# Patient Record
Sex: MALE | Race: ASIAN | NOT HISPANIC OR LATINO | Employment: OTHER | URBAN - METROPOLITAN AREA
[De-identification: names, ages, dates, MRNs, and addresses within clinical notes are randomized per-mention and may not be internally consistent; named-entity substitution may affect disease eponyms.]

---

## 2018-04-10 ENCOUNTER — HOSPITAL ENCOUNTER (OUTPATIENT)
Dept: MRI IMAGING | Facility: HOSPITAL | Age: 48
Discharge: HOME | End: 2018-04-10
Attending: FAMILY MEDICINE | Admitting: FAMILY MEDICINE

## 2018-04-16 ENCOUNTER — HOSPITAL ENCOUNTER (OUTPATIENT)
Dept: NUCLEAR MEDICINE | Facility: HOSPITAL | Age: 48
Discharge: HOME | End: 2018-04-16
Attending: INTERNAL MEDICINE | Admitting: INTERNAL MEDICINE

## 2018-06-11 ENCOUNTER — HOSPITAL ENCOUNTER (OUTPATIENT)
Dept: PAIN MEDICINE | Facility: HOSPITAL | Age: 48
Discharge: HOME | End: 2018-06-11
Attending: PHYSICAL MEDICINE & REHABILITATION | Admitting: PHYSICAL MEDICINE & REHABILITATION

## 2018-07-10 ENCOUNTER — HOSPITAL ENCOUNTER (OUTPATIENT)
Dept: PAIN MEDICINE | Facility: HOSPITAL | Age: 48
Discharge: HOME | End: 2018-07-10
Attending: PHYSICAL MEDICINE & REHABILITATION | Admitting: PHYSICAL MEDICINE & REHABILITATION

## 2019-07-10 ENCOUNTER — OFFICE VISIT (OUTPATIENT)
Dept: ORTHOPEDIC SURGERY | Facility: CLINIC | Age: 49
End: 2019-07-10

## 2019-07-10 VITALS
SYSTOLIC BLOOD PRESSURE: 119 MMHG | DIASTOLIC BLOOD PRESSURE: 70 MMHG | HEART RATE: 76 BPM | WEIGHT: 169.2 LBS | BODY MASS INDEX: 28.19 KG/M2 | HEIGHT: 65 IN

## 2019-07-10 DIAGNOSIS — M51.37 DDD (DEGENERATIVE DISC DISEASE), LUMBOSACRAL: Primary | ICD-10-CM

## 2019-07-10 PROCEDURE — 99213 OFFICE O/P EST LOW 20 MIN: CPT | Performed by: ORTHOPAEDIC SURGERY

## 2019-07-10 RX ORDER — FLUTICASONE PROPIONATE 50 MCG
SPRAY, SUSPENSION (ML) NASAL
Refills: 0 | COMMUNITY
Start: 2019-07-05

## 2019-07-10 RX ORDER — VENLAFAXINE HYDROCHLORIDE 75 MG/1
75 CAPSULE, EXTENDED RELEASE ORAL DAILY
Refills: 0 | COMMUNITY
Start: 2019-06-05 | End: 2020-08-25

## 2019-07-10 RX ORDER — NAPROXEN SODIUM 220 MG
TABLET ORAL
COMMUNITY
Start: 2013-08-16

## 2019-07-10 RX ORDER — CYCLOBENZAPRINE HCL 5 MG
5 TABLET ORAL NIGHTLY PRN
Refills: 0 | COMMUNITY
Start: 2019-05-30

## 2019-07-10 NOTE — PROGRESS NOTES
Visit Type: Follow Up  Referring Provider: No ref. provider found  Primary Care Provider: Cheyenne Argueta MD    Patient Name: Joel Hickman  Patient Age: 49 y.o.  Chief Complaint: had concerns including Follow-up and Pain of the Lumbar Spine.    Subjective   History of Present Illness: This patient is a 49 years old gentleman who complains of axial lower back pain with radiation up into his lower extremities right worse than left patient was diagnosed with multilevel disc degeneration spinal foraminal stenosis, congenital stenosis and short pedicle syndrome.      Review of Systems   Musculoskeletal: Positive for back pain and myalgias.   Neurological: Positive for numbness.       The following portions of the patient's history were reviewed and updated as appropriate: allergies, current medications, past family history, past medical history, past social history, past surgical history and problem list.    Past Medical History:   Diagnosis Date   • Arthritis    • Depression    • Diabetes mellitus (CMS/HCC)    • Hyperlipidemia    • Low back pain    • Pain management     Dr Sanders, 2 years       Past Surgical History:   Procedure Laterality Date   • APPENDECTOMY     • HERNIA REPAIR         Vitals:    07/10/19 1447   BP: 119/70   Pulse: 76       There is no problem list on file for this patient.      Family History Summary:  family history includes Other in his other.    Social History     Socioeconomic History   • Marital status:      Spouse name: Not on file   • Number of children: Not on file   • Years of education: Not on file   • Highest education level: Not on file   Tobacco Use   • Smoking status: Former Smoker     Packs/day: 0.50     Types: Cigarettes   Substance and Sexual Activity   • Alcohol use: Yes     Frequency: 2-4 times a month     Drinks per session: Patient refused   • Drug use: No   • Sexual activity: Defer         Current Outpatient Medications:   •  cyclobenzaprine (FLEXERIL) 5 MG tablet,  Take 5 mg by mouth At Night As Needed., Disp: , Rfl: 0  •  diclofenac (VOLTAREN) 1 % gel gel, apply 2 grams to AFFECTED JOINTS twice a day if needed for pain, Disp: , Rfl: 0  •  esomeprazole (nexIUM) 20 MG capsule, Take 20 mg by mouth Daily., Disp: , Rfl: 0  •  fluticasone (FLONASE) 50 MCG/ACT nasal spray, , Disp: , Rfl: 0  •  naproxen sodium (ALEVE) 220 MG tablet, Take  by mouth., Disp: , Rfl:   •  venlafaxine XR (EFFEXOR-XR) 75 MG 24 hr capsule, Take 75 mg by mouth Daily., Disp: , Rfl: 0    Allergies   Allergen Reactions   • Morphine Arrhythmia           Objective   Physical Exam  Back Exam     Tenderness   The patient is experiencing tenderness in the lumbar.    Range of Motion   Extension: 50   Flexion: 30   Lateral bend right:  30 abnormal   Lateral bend left:  30 abnormal   Rotation right:  20 abnormal   Rotation left:  20 abnormal     Muscle Strength   The patient has normal back strength.    Tests   Straight leg raise right: positive  Straight leg raise left: negative    Reflexes   Patellar: normal  Achilles: normal  Biceps: normal  Babinski's sign: normal     Other   Toe walk: normal  Heel walk: normal  Sensation: normal  Gait: normal               Impression and Plan: Patient is here today for follow-up patient was diagnosed with congenital spinal stenosis and short pedicle syndrome profoundly from L2-L5 patient tried a lumbar epidural block and physical therapy without significant benefit I will send this patient to proceed with facet block and radiofrequency and his pain does not get better I would like to obtain a CT scan of lumbar spine to be able to measure his bone density and possible surgical intervention in the future.    DDD (degenerative disc disease), lumbosacral [M51.37]     Orders Placed This Encounter   Procedures   • Ambulatory Referral to Pain Management     Referral Priority:   Routine     Referral Type:   Pain Management     Referral Reason:   Specialty Services Required     Requested  Specialty:   Pain Medicine     Number of Visits Requested:   1               Yuliya Graham MD  07/10/19  4:05 PM

## 2019-08-22 ENCOUNTER — APPOINTMENT (OUTPATIENT)
Dept: PAIN MEDICINE | Facility: CLINIC | Age: 49
End: 2019-08-22

## 2019-10-15 ENCOUNTER — APPOINTMENT (OUTPATIENT)
Dept: PAIN MEDICINE | Facility: CLINIC | Age: 49
End: 2019-10-15

## 2019-11-19 ENCOUNTER — TELEPHONE (OUTPATIENT)
Dept: PAIN MEDICINE | Facility: HOSPITAL | Age: 49
End: 2019-11-19

## 2019-11-19 ENCOUNTER — OFFICE VISIT (OUTPATIENT)
Dept: PAIN MEDICINE | Facility: CLINIC | Age: 49
End: 2019-11-19

## 2019-11-19 VITALS
WEIGHT: 175 LBS | RESPIRATION RATE: 16 BRPM | TEMPERATURE: 98.1 F | SYSTOLIC BLOOD PRESSURE: 123 MMHG | HEART RATE: 68 BPM | OXYGEN SATURATION: 99 % | HEIGHT: 65 IN | BODY MASS INDEX: 29.16 KG/M2 | DIASTOLIC BLOOD PRESSURE: 78 MMHG

## 2019-11-19 DIAGNOSIS — G89.29 CHRONIC BILATERAL LOW BACK PAIN WITH SCIATICA, SCIATICA LATERALITY UNSPECIFIED: Primary | ICD-10-CM

## 2019-11-19 DIAGNOSIS — M47.817 OSTEOARTHRITIS OF LUMBOSACRAL SPINE WITHOUT MYELOPATHY: ICD-10-CM

## 2019-11-19 DIAGNOSIS — M47.27 OSTEOARTHRITIS OF SPINE WITH RADICULOPATHY, LUMBOSACRAL REGION: ICD-10-CM

## 2019-11-19 DIAGNOSIS — M54.40 CHRONIC BILATERAL LOW BACK PAIN WITH SCIATICA, SCIATICA LATERALITY UNSPECIFIED: Primary | ICD-10-CM

## 2019-11-19 DIAGNOSIS — M47.816 ARTHROPATHY OF LUMBAR FACET JOINT: ICD-10-CM

## 2019-11-19 PROBLEM — M48.061 SPINAL STENOSIS OF LUMBAR REGION: Status: ACTIVE | Noted: 2018-05-23

## 2019-11-19 PROBLEM — M54.17 LUMBOSACRAL RADICULOPATHY: Status: ACTIVE | Noted: 2018-05-23

## 2019-11-19 PROBLEM — M79.7 FIBROSITIS: Status: ACTIVE | Noted: 2019-11-19

## 2019-11-19 PROBLEM — M51.36 BULGE OF LUMBAR DISC WITHOUT MYELOPATHY: Status: ACTIVE | Noted: 2019-11-19

## 2019-11-19 PROBLEM — M54.50 LOW BACK PAIN: Status: ACTIVE | Noted: 2018-05-23

## 2019-11-19 PROBLEM — M54.16 LUMBAR RADICULOPATHY: Status: ACTIVE | Noted: 2019-11-19

## 2019-11-19 PROCEDURE — 99213 OFFICE O/P EST LOW 20 MIN: CPT | Performed by: PHYSICAL MEDICINE & REHABILITATION

## 2019-11-19 PROCEDURE — G0463 HOSPITAL OUTPT CLINIC VISIT: HCPCS | Performed by: PHYSICAL MEDICINE & REHABILITATION

## 2019-11-19 RX ORDER — SERTRALINE HYDROCHLORIDE 25 MG/1
25 TABLET, FILM COATED ORAL DAILY
Refills: 0 | COMMUNITY
Start: 2019-11-18 | End: 2020-08-25

## 2019-11-19 RX ORDER — BUSPIRONE HYDROCHLORIDE 10 MG/1
10 TABLET ORAL 2 TIMES DAILY
Refills: 0 | COMMUNITY
Start: 2019-11-18

## 2019-11-19 NOTE — PROGRESS NOTES
Subjective   Joel Hickman is a 49 y.o. male.     Low back pain, began around 1998, worsening, 6/10 at best, 6/10 today, always present, varies, aching, sharp, worse with exercise, standing, interferes with ADLs, activity, sleep, failed meds, chiropractor. MRI L-spine with multilevel DDD, DJD. Saw Dr. Graham, notes reviewed, as above, with h/o failed LESIs, referral for L2-L5 blocks and RFA, congenital stenosis, with surgery as a last resort.          The following portions of the patient's history were reviewed and updated as appropriate: allergies, current medications, past family history, past medical history, past social history, past surgical history and problem list.    Review of Systems   Constitutional: Negative for chills, fatigue and fever.   HENT: Negative for hearing loss and trouble swallowing.    Eyes: Negative for visual disturbance.   Respiratory: Negative for shortness of breath.    Cardiovascular: Negative for chest pain.   Gastrointestinal: Negative for abdominal pain, constipation, diarrhea, nausea and vomiting.   Genitourinary: Negative for urinary incontinence.   Musculoskeletal: Positive for back pain and myalgias. Negative for arthralgias, joint swelling and neck pain.   Neurological: Negative for dizziness, weakness, numbness and headache.       Objective   Physical Exam   Constitutional: He is oriented to person, place, and time. He appears well-developed and well-nourished.   HENT:   Head: Normocephalic and atraumatic.   Eyes: EOM are normal. Pupils are equal, round, and reactive to light.   Neck: Normal range of motion.   Cardiovascular: Normal rate, regular rhythm, normal heart sounds and intact distal pulses.   Pulmonary/Chest: Breath sounds normal.   Abdominal: Soft. Bowel sounds are normal. He exhibits no distension. There is no tenderness.   Neurological: He is alert and oriented to person, place, and time. He has normal strength and normal reflexes. He displays normal reflexes. No  sensory deficit.   Psychiatric: He has a normal mood and affect. His behavior is normal. Thought content normal.         Assessment/Plan   There are no diagnoses linked to this encounter.    Performed b/l L2/3, L3/4, L4/5 MBB with nearly 100% relief.  Schedule left then right lumbar RFA.  No change in meds today.  RTC for procedures.

## 2020-01-13 ENCOUNTER — PRIOR AUTHORIZATION (OUTPATIENT)
Dept: PAIN MEDICINE | Facility: CLINIC | Age: 50
End: 2020-01-13

## 2020-01-13 NOTE — TELEPHONE ENCOUNTER
Dr. Castle patient's insurance has changed and with this new insurance you can not do bilateral and 3 levels. Would you like to do right or left side or change it to bilateral @ 2 levels.

## 2020-01-13 NOTE — TELEPHONE ENCOUNTER
We've already done one b/l 3 level. Let's do 3 level left and right separately. It'll take an extra injection, but then we'll be set up to give him more relief ongoing with the RFAs. Thanks.

## 2020-01-13 NOTE — TELEPHONE ENCOUNTER
Patient will need 2 appt's to do MBB @ 3 levels. One for right side and then another for left side. And I need to know what side he is doing on 1/27/2020 please? Thanks

## 2020-01-27 ENCOUNTER — APPOINTMENT (OUTPATIENT)
Dept: PAIN MEDICINE | Facility: HOSPITAL | Age: 50
End: 2020-01-27

## 2020-04-09 ENCOUNTER — APPOINTMENT (OUTPATIENT)
Dept: PAIN MEDICINE | Facility: CLINIC | Age: 50
End: 2020-04-09

## 2020-08-25 ENCOUNTER — OFFICE VISIT (OUTPATIENT)
Dept: PAIN MEDICINE | Facility: CLINIC | Age: 50
End: 2020-08-25

## 2020-08-25 VITALS
HEART RATE: 77 BPM | TEMPERATURE: 98.9 F | OXYGEN SATURATION: 99 % | BODY MASS INDEX: 28.32 KG/M2 | HEIGHT: 65 IN | SYSTOLIC BLOOD PRESSURE: 128 MMHG | DIASTOLIC BLOOD PRESSURE: 84 MMHG | WEIGHT: 170 LBS | RESPIRATION RATE: 16 BRPM

## 2020-08-25 DIAGNOSIS — M47.27 OSTEOARTHRITIS OF SPINE WITH RADICULOPATHY, LUMBOSACRAL REGION: ICD-10-CM

## 2020-08-25 DIAGNOSIS — M54.17 LUMBOSACRAL RADICULOPATHY: ICD-10-CM

## 2020-08-25 DIAGNOSIS — M47.816 ARTHROPATHY OF LUMBAR FACET JOINT: ICD-10-CM

## 2020-08-25 DIAGNOSIS — M54.40 CHRONIC BILATERAL LOW BACK PAIN WITH SCIATICA, SCIATICA LATERALITY UNSPECIFIED: Primary | ICD-10-CM

## 2020-08-25 DIAGNOSIS — M47.817 OSTEOARTHRITIS OF LUMBOSACRAL SPINE WITHOUT MYELOPATHY: ICD-10-CM

## 2020-08-25 DIAGNOSIS — M51.36 DDD (DEGENERATIVE DISC DISEASE), LUMBAR: ICD-10-CM

## 2020-08-25 DIAGNOSIS — G89.29 CHRONIC BILATERAL LOW BACK PAIN WITH SCIATICA, SCIATICA LATERALITY UNSPECIFIED: Primary | ICD-10-CM

## 2020-08-25 PROCEDURE — G0463 HOSPITAL OUTPT CLINIC VISIT: HCPCS | Performed by: PHYSICAL MEDICINE & REHABILITATION

## 2020-08-25 PROCEDURE — 99214 OFFICE O/P EST MOD 30 MIN: CPT | Performed by: PHYSICAL MEDICINE & REHABILITATION

## 2020-08-25 RX ORDER — NORTRIPTYLINE HYDROCHLORIDE 25 MG/1
25 CAPSULE ORAL NIGHTLY
Qty: 30 CAPSULE | Refills: 1 | Status: SHIPPED | OUTPATIENT
Start: 2020-08-25

## 2020-08-25 RX ORDER — FLUOXETINE HYDROCHLORIDE 20 MG/1
20 CAPSULE ORAL DAILY
COMMUNITY

## 2020-08-25 NOTE — PROGRESS NOTES
Subjective   Joel Hickman is a 50 y.o. male.     Low back pain, began around 1998, worsening, 6/10 at best, 6/10 today, always present, varies, aching, sharp, worse with exercise, standing, interferes with ADLs, activity, sleep, failed meds, chiropractor. MRI L-spine with multilevel DDD, DJD. Saw Dr. Graham, notes reviewed, as above, with h/o failed LESIs, referral for L2-L5 blocks and RFA, congenital stenosis, with surgery as a last resort. Had MBB x 2, does not wish to proceed with RFA at this time. Failed Norco and Percocet in past, as well as Cymbalta. Denies Effexor or Zoloft.       The following portions of the patient's history were reviewed and updated as appropriate: allergies, current medications, past family history, past medical history, past social history, past surgical history and problem list.    Review of Systems   Constitutional: Negative for chills, fatigue and fever.   HENT: Negative for hearing loss and trouble swallowing.    Eyes: Negative for visual disturbance.   Respiratory: Negative for shortness of breath.    Cardiovascular: Negative for chest pain.   Gastrointestinal: Negative for abdominal pain, constipation, diarrhea, nausea and vomiting.   Genitourinary: Negative for urinary incontinence.   Musculoskeletal: Positive for back pain and myalgias. Negative for arthralgias, joint swelling and neck pain.   Neurological: Negative for dizziness, weakness, numbness and headache.       Objective   Physical Exam   Constitutional: He is oriented to person, place, and time. He appears well-developed and well-nourished.   HENT:   Head: Normocephalic and atraumatic.   Eyes: Pupils are equal, round, and reactive to light. EOM are normal.   Neck: Normal range of motion.   Cardiovascular: Normal rate, regular rhythm, normal heart sounds and intact distal pulses.   Pulmonary/Chest: Breath sounds normal.   Abdominal: Soft. Bowel sounds are normal. He exhibits no distension. There is no tenderness.    Neurological: He is alert and oriented to person, place, and time. He has normal strength and normal reflexes. He displays normal reflexes. No sensory deficit.   Psychiatric: He has a normal mood and affect. His behavior is normal. Thought content normal.         Assessment/Plan   Joel was seen today for back pain.    Diagnoses and all orders for this visit:    Chronic bilateral low back pain with sciatica, sciatica laterality unspecified    Lumbosacral radiculopathy    Arthropathy of lumbar facet joint    DDD (degenerative disc disease), lumbar    Osteoarthritis of lumbosacral spine without myelopathy    Osteoarthritis of spine with radiculopathy, lumbosacral region        Performed b/l L2/3, L3/4, L4/5 MBB with nearly 100% relief.  Schedule left then right lumbar RFA.  No change in meds today.  RTC for procedures.

## 2020-10-20 ENCOUNTER — OFFICE VISIT (OUTPATIENT)
Dept: PAIN MEDICINE | Facility: CLINIC | Age: 50
End: 2020-10-20

## 2020-10-20 VITALS
OXYGEN SATURATION: 98 % | DIASTOLIC BLOOD PRESSURE: 80 MMHG | BODY MASS INDEX: 28.32 KG/M2 | RESPIRATION RATE: 16 BRPM | HEIGHT: 65 IN | SYSTOLIC BLOOD PRESSURE: 133 MMHG | WEIGHT: 170 LBS | TEMPERATURE: 97.8 F | HEART RATE: 72 BPM

## 2020-10-20 DIAGNOSIS — M48.061 SPINAL STENOSIS OF LUMBAR REGION, UNSPECIFIED WHETHER NEUROGENIC CLAUDICATION PRESENT: ICD-10-CM

## 2020-10-20 DIAGNOSIS — M51.36 DDD (DEGENERATIVE DISC DISEASE), LUMBAR: ICD-10-CM

## 2020-10-20 DIAGNOSIS — M54.40 CHRONIC BILATERAL LOW BACK PAIN WITH SCIATICA, SCIATICA LATERALITY UNSPECIFIED: ICD-10-CM

## 2020-10-20 DIAGNOSIS — M47.27 OSTEOARTHRITIS OF SPINE WITH RADICULOPATHY, LUMBOSACRAL REGION: ICD-10-CM

## 2020-10-20 DIAGNOSIS — F19.90 CURRENT DRUG USE: Primary | ICD-10-CM

## 2020-10-20 DIAGNOSIS — M47.816 ARTHROPATHY OF LUMBAR FACET JOINT: ICD-10-CM

## 2020-10-20 DIAGNOSIS — M47.817 OSTEOARTHRITIS OF LUMBOSACRAL SPINE WITHOUT MYELOPATHY: ICD-10-CM

## 2020-10-20 DIAGNOSIS — M54.17 LUMBOSACRAL RADICULOPATHY: ICD-10-CM

## 2020-10-20 DIAGNOSIS — G89.29 CHRONIC BILATERAL LOW BACK PAIN WITH SCIATICA, SCIATICA LATERALITY UNSPECIFIED: ICD-10-CM

## 2020-10-20 PROCEDURE — G0463 HOSPITAL OUTPT CLINIC VISIT: HCPCS | Performed by: PHYSICAL MEDICINE & REHABILITATION

## 2020-10-20 PROCEDURE — 99214 OFFICE O/P EST MOD 30 MIN: CPT | Performed by: PHYSICAL MEDICINE & REHABILITATION

## 2020-10-20 RX ORDER — HYDROCODONE BITARTRATE AND ACETAMINOPHEN 5; 325 MG/1; MG/1
1 TABLET ORAL EVERY 8 HOURS PRN
Qty: 90 TABLET | Refills: 0 | Status: SHIPPED | OUTPATIENT
Start: 2020-10-20 | End: 2020-10-20 | Stop reason: SDUPTHER

## 2020-10-20 RX ORDER — HYDROCODONE BITARTRATE AND ACETAMINOPHEN 5; 325 MG/1; MG/1
1 TABLET ORAL EVERY 8 HOURS PRN
Qty: 90 TABLET | Refills: 0 | Status: SHIPPED | OUTPATIENT
Start: 2020-10-20

## 2020-10-20 RX ORDER — HYDROCODONE BITARTRATE AND ACETAMINOPHEN 5; 325 MG/1; MG/1
1 TABLET ORAL EVERY 8 HOURS PRN
Qty: 21 TABLET | Refills: 0 | Status: SHIPPED | OUTPATIENT
Start: 2020-10-20 | End: 2020-10-20 | Stop reason: SDUPTHER

## 2020-10-20 RX ORDER — PANTOPRAZOLE SODIUM 40 MG/1
40 TABLET, DELAYED RELEASE ORAL DAILY
COMMUNITY
Start: 2020-07-29

## 2020-10-20 RX ORDER — PREGABALIN 75 MG/1
75 CAPSULE ORAL 2 TIMES DAILY
Qty: 60 CAPSULE | Refills: 1 | Status: SHIPPED | OUTPATIENT
Start: 2020-10-20

## 2020-10-20 NOTE — PROGRESS NOTES
Subjective   Joel Hickman is a 50 y.o. male.     Low back pain, began around 1998, worsening, 6/10 at best, 6/10 today, always present, varies, aching, sharp, worse with exercise, standing, interferes with ADLs, activity, sleep, failed meds, chiropractor. MRI L-spine with multilevel DDD, DJD. Saw Dr. Graham, notes reviewed, as above, with h/o failed LESIs, referral for L2-L5 blocks and RFA, congenital stenosis, with surgery as a last resort. Had MBB x 2, does not wish to proceed with RFA at this time. Failed Norco and Percocet in past, as well as Cymbalta. Denies Effexor or Zoloft.       The following portions of the patient's history were reviewed and updated as appropriate: allergies, current medications, past family history, past medical history, past social history, past surgical history and problem list.    Review of Systems   Constitutional: Negative for chills, fatigue and fever.   HENT: Negative for hearing loss and trouble swallowing.    Eyes: Negative for visual disturbance.   Respiratory: Negative for shortness of breath.    Cardiovascular: Negative for chest pain.   Gastrointestinal: Negative for abdominal pain, constipation, diarrhea, nausea and vomiting.   Genitourinary: Negative for urinary incontinence.   Musculoskeletal: Positive for back pain and myalgias. Negative for arthralgias, joint swelling and neck pain.   Neurological: Negative for dizziness, weakness, numbness and headache.       Objective   Physical Exam   Constitutional: He is oriented to person, place, and time. He appears well-developed and well-nourished.   HENT:   Head: Normocephalic and atraumatic.   Eyes: Pupils are equal, round, and reactive to light.   Neck: Normal range of motion.   Cardiovascular: Normal rate, regular rhythm and normal heart sounds.   Pulmonary/Chest: Breath sounds normal.   Abdominal: Soft. Bowel sounds are normal. He exhibits no distension. There is no abdominal tenderness.   Neurological: He is alert and  oriented to person, place, and time. He has normal reflexes. He displays normal reflexes. No sensory deficit.   Psychiatric: His behavior is normal. Thought content normal.         Assessment/Plan   Diagnoses and all orders for this visit:    1. Chronic bilateral low back pain with sciatica, sciatica laterality unspecified (Primary)    2. Lumbosacral radiculopathy    3. Arthropathy of lumbar facet joint    4. DDD (degenerative disc disease), lumbar    5. Osteoarthritis of lumbosacral spine without myelopathy    6. Osteoarthritis of spine with radiculopathy, lumbosacral region    7. Spinal stenosis of lumbar region, unspecified whether neurogenic claudication present        Performed b/l L2/3, L3/4, L4/5 MBB with nearly 100% relief.  Postpone left then right lumbar RFA for now.  Discussed risks and benefits of opioid treatment for chonic pain with patient, including expectations related to prescription requests, alternative modalities to opioids for managing pain, her treatment plan, risks of dependency and addiction, and safe storage practices for prescribed opioids, as well as proper and improper disposal of all medications.  Will obtain UDS today, pain contract today.  Inspect report reviewed, prior notes reviewed, consistent with patient's stated history.  Treatment plan will consist of continuing current medication as long as it remains effective and is necessary, while evaluating patient at each visit and determining if the medication can be lowered or discontinued, while also using nonopioid therapies to reduce reliance on opioids.  Begin Norco 5mg TID prn, failed NSAIDs, Tylenol.  Begin Lyrica 75mg BID.  RTC 6 weeks for f/u.

## 2020-12-04 ENCOUNTER — APPOINTMENT (OUTPATIENT)
Dept: PAIN MEDICINE | Facility: CLINIC | Age: 50
End: 2020-12-04

## 2024-05-14 ENCOUNTER — TELEPHONE (OUTPATIENT)
Dept: PAIN MEDICINE | Facility: CLINIC | Age: 54
End: 2024-05-14
Payer: MEDICARE

## 2024-05-14 NOTE — TELEPHONE ENCOUNTER
Caller: Joel Hickman    Relationship to patient: Self    Best call back number: [phone]    Type of visit: NEW PATIENT    Requested date: ASAP     If rescheduling, when is the original appointment: 10/24/23     Additional notes:PATIENT WOULD LIKE TO R/S THE APPT HE MISSED LAST YEAR. OK TO R/S? PLEASE ADVISE.